# Patient Record
Sex: FEMALE | URBAN - METROPOLITAN AREA
[De-identification: names, ages, dates, MRNs, and addresses within clinical notes are randomized per-mention and may not be internally consistent; named-entity substitution may affect disease eponyms.]

---

## 2022-09-19 ENCOUNTER — OFFICE VISIT (OUTPATIENT)
Dept: URBAN - METROPOLITAN AREA CLINIC 33 | Facility: CLINIC | Age: 52
End: 2022-09-19

## 2022-09-19 NOTE — HPI-CHANGE IN BOWEL HABITS
52 year old female patient presents today for change in bowel habits. Patient admits (recent onset/longstanding history) of (diarrhea/constipation/change in bowel habits/varying bowel habits). Onset was --. Patient currently admits -- bowel movements per --, with/without strain. Stools are --. Admits/Denies any blood, mucus, or melena in stools. She admits/denies any pruritus ani or rectal pain.    Patient states that her previous bowel habits were -- movements per --, with -- and -- stools.  She notes -- aggravating factors and -- alleviating factors and has tried -- medications with -- relief of symptoms. Patient admits/denies associated abdominal pains, bloating, and gas. When patient has a bowel movement she does/does not feel like she is completely emptying her bowels. Denies/Admits fecal incontinence (conscious/unconscious). Patient denies/admits recently drinking lake or well water, any changes in their medications, or taking any antibiotics in the last 6 months. (Antibiotics taken were -- used to treat --.)

## 2022-09-19 NOTE — HPI-DYSPEPSIA
____ y/o patient presents today with a recent onset of dyspepsia. The symptoms are described as a discomfort in the epigastric region, especially after eating. Patient admits/denies bloating, gassiness, nausea, vomiting, heartburn, sour eructations, regurgitation or early satiety  Patient states that he/she has tried omeprazole medication with no relief of symptoms and is not currently taking this medication. Patient denies coughing or excessive belching, or changes in bowel habits. Patient denies changes in his/her medications.   Patient denies family hx of gastric/esophageal cancer or diseases. Patient admits past EGD performed in ___ by ___with ____ findings.  Patient denies gastric emptying study performed.

## 2022-09-30 ENCOUNTER — DASHBOARD ENCOUNTERS (OUTPATIENT)
Age: 52
End: 2022-09-30

## 2022-09-30 ENCOUNTER — OFFICE VISIT (OUTPATIENT)
Dept: URBAN - METROPOLITAN AREA CLINIC 33 | Facility: CLINIC | Age: 52
End: 2022-09-30
Payer: COMMERCIAL

## 2022-09-30 ENCOUNTER — LAB OUTSIDE AN ENCOUNTER (OUTPATIENT)
Dept: URBAN - METROPOLITAN AREA CLINIC 33 | Facility: CLINIC | Age: 52
End: 2022-09-30

## 2022-09-30 VITALS
DIASTOLIC BLOOD PRESSURE: 84 MMHG | SYSTOLIC BLOOD PRESSURE: 128 MMHG | HEART RATE: 98 BPM | OXYGEN SATURATION: 97 % | BODY MASS INDEX: 31.32 KG/M2 | WEIGHT: 188 LBS | HEIGHT: 65 IN

## 2022-09-30 DIAGNOSIS — R10.13 DYSPEPSIA: ICD-10-CM

## 2022-09-30 DIAGNOSIS — R12 HEARTBURN: ICD-10-CM

## 2022-09-30 DIAGNOSIS — R19.4 CHANGE IN BOWEL HABITS: ICD-10-CM

## 2022-09-30 PROCEDURE — 99203 OFFICE O/P NEW LOW 30 MIN: CPT | Performed by: INTERNAL MEDICINE

## 2022-09-30 RX ORDER — SODIUM PICOSULFATE, MAGNESIUM OXIDE, AND ANHYDROUS CITRIC ACID 10; 3.5; 12 MG/160ML; G/160ML; G/160ML
160 ML LIQUID ORAL
Qty: 320 MILLILITER | Refills: 0 | OUTPATIENT
Start: 2022-09-30 | End: 2022-10-02

## 2022-09-30 NOTE — HPI-CHANGE IN BOWEL HABITS
52 year old female patient presents today for change in bowel habits. Patient admits longstanding history of change in bowel habits. Onset was several months ago with menopause. Patient currently admits 0-1bowel movements per day, without strain. Stools are soft "pasty" to hard. Denies any blood, mucus, or melena in stools. She denies any pruritus ani or rectal pain.  Patient has tried natural tea, smooth move medications with some relief of symptoms.  Patient admits associated abdominal pains, bloating, and gas.  When patient has a bowel movement she does not feel like she is completely emptying her bowels. Admits fecal incontinence (conscious/unconscious).  Patient denies recently drinking lake or well water, any changes in their medications, or taking any antibiotics in the last 6 months.

## 2022-09-30 NOTE — HPI-DYSPEPSIA
51 y/o patient presents today with a recent onset of dyspepsia. The symptoms are described as a discomfort in the epigastric region, especially after eating. Patient admits bloating, gassiness, nausea, heartburn, heartburn, sour eructations, regurgitation or early satiety  denies vomiting.  Patient states that she has tried omeprazole medication with no relief of symptoms and as PRN.   Patient denies family hx of gastric/esophageal cancer or diseases. Patient admits past EGD performed in the past. Patient denies gastric emptying study performed.

## 2022-10-07 ENCOUNTER — LAB OUTSIDE AN ENCOUNTER (OUTPATIENT)
Dept: URBAN - METROPOLITAN AREA CLINIC 33 | Facility: CLINIC | Age: 52
End: 2022-10-07

## 2022-10-24 ENCOUNTER — TELEPHONE ENCOUNTER (OUTPATIENT)
Dept: URBAN - METROPOLITAN AREA CLINIC 36 | Facility: CLINIC | Age: 52
End: 2022-10-24

## 2022-11-01 ENCOUNTER — OFFICE VISIT (OUTPATIENT)
Dept: URBAN - METROPOLITAN AREA SURGERY CENTER 8 | Facility: SURGERY CENTER | Age: 52
End: 2022-11-01

## 2022-12-07 ENCOUNTER — OFFICE VISIT (OUTPATIENT)
Dept: URBAN - METROPOLITAN AREA CLINIC 23 | Facility: CLINIC | Age: 52
End: 2022-12-07